# Patient Record
Sex: FEMALE | Race: WHITE | Employment: OTHER | URBAN - METROPOLITAN AREA
[De-identification: names, ages, dates, MRNs, and addresses within clinical notes are randomized per-mention and may not be internally consistent; named-entity substitution may affect disease eponyms.]

---

## 2024-01-04 ENCOUNTER — APPOINTMENT (OUTPATIENT)
Dept: PHYSICAL THERAPY | Age: 79
End: 2024-01-04
Payer: MEDICARE

## 2024-01-08 ENCOUNTER — APPOINTMENT (OUTPATIENT)
Dept: PHYSICAL THERAPY | Age: 79
End: 2024-01-08
Payer: MEDICARE

## 2024-01-09 ENCOUNTER — HOSPITAL ENCOUNTER (OUTPATIENT)
Dept: PHYSICAL THERAPY | Age: 79
Setting detail: RECURRING SERIES
End: 2024-01-09
Payer: MEDICARE

## 2024-01-10 ENCOUNTER — APPOINTMENT (OUTPATIENT)
Dept: PHYSICAL THERAPY | Age: 79
End: 2024-01-10
Payer: MEDICARE

## 2024-01-12 ENCOUNTER — APPOINTMENT (OUTPATIENT)
Dept: PHYSICAL THERAPY | Age: 79
End: 2024-01-12
Payer: MEDICARE

## 2024-01-15 ENCOUNTER — APPOINTMENT (OUTPATIENT)
Dept: PHYSICAL THERAPY | Age: 79
End: 2024-01-15
Payer: MEDICARE

## 2024-01-17 ENCOUNTER — APPOINTMENT (OUTPATIENT)
Dept: PHYSICAL THERAPY | Age: 79
End: 2024-01-17
Payer: MEDICARE

## 2024-01-19 ENCOUNTER — APPOINTMENT (OUTPATIENT)
Dept: PHYSICAL THERAPY | Age: 79
End: 2024-01-19
Payer: MEDICARE

## 2024-01-22 ENCOUNTER — APPOINTMENT (OUTPATIENT)
Dept: PHYSICAL THERAPY | Age: 79
End: 2024-01-22
Payer: MEDICARE

## 2024-01-24 ENCOUNTER — APPOINTMENT (OUTPATIENT)
Dept: PHYSICAL THERAPY | Age: 79
End: 2024-01-24
Payer: MEDICARE

## 2024-01-26 ENCOUNTER — HOSPITAL ENCOUNTER (OUTPATIENT)
Dept: PHYSICAL THERAPY | Age: 79
Setting detail: RECURRING SERIES
Discharge: HOME | End: 2024-01-29
Payer: MEDICARE

## 2024-01-26 DIAGNOSIS — R29.898 WEAKNESS OF LEFT HAND: ICD-10-CM

## 2024-01-26 DIAGNOSIS — M79.641 PAIN IN RIGHT HAND: ICD-10-CM

## 2024-01-26 DIAGNOSIS — R29.898 WEAKNESS OF RIGHT HAND: ICD-10-CM

## 2024-01-26 DIAGNOSIS — M79.642 PAIN IN LEFT HAND: Primary | ICD-10-CM

## 2024-01-26 PROCEDURE — 97162 PT EVAL MOD COMPLEX 30 MIN: CPT

## 2024-01-26 ASSESSMENT — PAIN SCALES - GENERAL: PAINLEVEL_OUTOF10: 3

## 2024-01-26 NOTE — PROGRESS NOTES
Chantel Oneill  : 1945  Primary: [employer] [place] H[place]/p* (Medicare Managed)  Secondary:  [facility] @ [place] [address] [address] [place] [address]  Phone: [phone]  Fax: [phone] Plan Frequency: 2 times per week for 8 weeks    Plan of Care/Certification Expiration Date: 24        Plan of Care/Certification Expiration Date:  Plan of Care/Certification Expiration Date: 24    Frequency/Duration: Plan Frequency: 2 times per week for 8 weeks      Time In/Out:          PT Visit Info:    Plan Frequency: 2 times per week for 8 weeks      Visit Count:  1    OUTPATIENT PHYSICAL THERAPY:   Treatment Note 2024       Episode  (Carpal Tunnel Rehab)               Treatment Diagnosis:    Pain in left hand  Pain in right hand  Weakness of left hand  Weakness of right hand  Medical/Referring Diagnosis:    Left carpal tunnel syndrome    Referring Physician:  Elton Huber MD MD Orders:  PT Eval and Treat   Return MD Appt:  TBD   Date of Onset:  Onset Date: 20     Allergies:   Patient has no allergy information on record.  Restrictions/Precautions:   None      Interventions Planned (Treatment may consist of any combination of the following):     See Assessment Note    Subjective Comments:   See evaluation note  Initial Pain Level::     3/10  Post Session Pain Level:       3/10  Medications Last Reviewed:  2024  Updated Objective Findings:  See Evaluation Note from today    Evaluation 24:  TreatmentPalpation:   Tenderness to the left and right thenar eminence  Tenderness to the left CMC joint line tenderness  Tenderness at the left trapezium   Range of Motion:   Wrist extension left: 60 deg no change  Wrist flexion left: 70 deg pain thenar emmenence  Wrist extension right 60 deg no change  Wrist flexion right 70 deg no discomfort  Left hand:  Thumb abduction: 65 deg discomfort thenar emmenence  Thumb adduction: 45 deg no discomfort  Thumb flexion: 73 deg no

## 2024-01-26 NOTE — THERAPY EVALUATION
Chantel Oneill  : 1945  Primary: [employer] [facility] [place] Medicare Hmo/p* (Medicare Managed)  Secondary:  [facility] @ [place] [address]  Phone: [phone]  Fax: [phone] Plan Frequency: 2 times per week for 8 weeks    Plan of Care/Certification Expiration Date: 24        Plan of Care/Certification Expiration Date:  Plan of Care/Certification Expiration Date: 24    Frequency/Duration: Plan Frequency: 2 times per week for 8 weeks      Time In/Out:          PT Visit Info:    Plan Frequency: 2 times per week for 8 weeks      Visit Count:  1                OUTPATIENT PHYSICAL THERAPY:             Initial Assessment 2024               Episode (Carpal Tunnel Rehab)         Treatment Diagnosis:     Pain in left hand  Pain in right hand  Weakness of left hand  Weakness of right hand  Medical/Referring Diagnosis:    Left carpal tunnel syndrome    Referring Physician:  Elton Huber MD MD Orders:  PT Eval and Treat   Return MD Appt:  TBBRIAN  Date of Onset:  Onset Date: 20     Allergies:  Patient has no allergy information on record.  Restrictions/Precautions:    None      Medications Last Reviewed:  2024     SUBJECTIVE   History of Injury/Illness (Reason for Referral):  Patient is a pleasant 78 year old female with complaints of bilateral thenar eminence pain (left is worse than right). Patient notes the pain began prior to 3 years ago and she was sent for nerve conduction testing. She notes that she was then referred to a hand specialist. She recently had an injection in the left carpal tunnel which improved her pain for 2 weeks. Patient notes she has pain with resting her hand on a surface. For example, she notes that when resting her hand on her lap she rests with the hand palm up to avoid pressure on the hand which can lead to burning sensation. Patient notes she cannot open jars and has some difficulty lifting items in the kitchen. She

## 2024-01-29 ENCOUNTER — HOSPITAL ENCOUNTER (OUTPATIENT)
Dept: PHYSICAL THERAPY | Age: 79
Setting detail: RECURRING SERIES
Discharge: HOME | End: 2024-02-01
Payer: MEDICARE

## 2024-01-29 ENCOUNTER — APPOINTMENT (OUTPATIENT)
Dept: PHYSICAL THERAPY | Age: 79
End: 2024-01-29
Payer: MEDICARE

## 2024-01-29 PROCEDURE — 97140 MANUAL THERAPY 1/> REGIONS: CPT

## 2024-01-29 PROCEDURE — 97110 THERAPEUTIC EXERCISES: CPT

## 2024-01-29 NOTE — PROGRESS NOTES
compensatory activities    Date: 1/26/24  Visit 1 1/29/24 (visit 2)      Modalities:  10 min (unbilled)       Evaluation only ** Paraffin post exercise x10 in to B hands                      Therapeutic Exercise:  25 min        Finger flexion () and extend x30        Lumbrical  x20        Forearm pronation/supination 2x10        Yellow ball squeeze x20 B        Wrist flexion/extension 2x10              Proprioceptive Activities:                                Manual Therapy:  15 min        PROM into flexion, extension supination/pronation              Functional Activities:                                            /Session Summary:    Treatment Assessment:   Pt did well with increased AROM and did not report increased pain. Continue to progress hand and wrist strengthening.  Communication/Consultation:  Pt to continue current HEP.  Equipment provided today:  None  Recommendations/Intent for next treatment session: Next visit will focus on pain management, hand mobility and strength.    >Total Treatment Billable Duration:  40 minutes   Time In: 0331  Time Out: 0421    Melodie Jeffries PTA         [employer]  [facility] [employer] [facility]  Appt Desk     Future Appointments   Date Time Provider Department Center   1/31/2024  3:30 PM Melodie Jeffries, PTA [facility]   2/6/2024  2:00 PM Frank Alvares, PT [facility]   2/9/2024  2:00 PM Frank Alvares, PT [facility]   2/16/2024  3:30 PM Leno Vitale, PT [facility]   2/20/2024  2:00 PM Melodie Jeffries, PTA [facility]   2/23/2024  3:30 PM Melodie Jeffries, PTA [facility]   3/1/2024  9:30 AM Melodie Jeffries, PTA [facility]

## 2024-01-31 ENCOUNTER — APPOINTMENT (OUTPATIENT)
Dept: PHYSICAL THERAPY | Age: 79
End: 2024-01-31
Payer: MEDICARE

## 2024-01-31 ENCOUNTER — HOSPITAL ENCOUNTER (OUTPATIENT)
Dept: PHYSICAL THERAPY | Age: 79
Setting detail: RECURRING SERIES
Discharge: HOME | End: 2024-02-03
Payer: MEDICARE

## 2024-01-31 PROCEDURE — 97110 THERAPEUTIC EXERCISES: CPT

## 2024-01-31 PROCEDURE — 97140 MANUAL THERAPY 1/> REGIONS: CPT

## 2024-01-31 NOTE — PROGRESS NOTES
Chantel Oneill  : 1945  Primary: [employer] [facility] [employer] [place] H[employer]/p* (Medicare Managed)  Secondary:  [facility] @ [place] [address] [address] [place] [address]  Phone: [phone]  Fax: [phone] Plan Frequency: 2 times per week for 8 weeks    Plan of Care/Certification Expiration Date: 24        Plan of Care/Certification Expiration Date:  Plan of Care/Certification Expiration Date: 24    Frequency/Duration: Plan Frequency: 2 times per week for 8 weeks      Time In/Out:   Time In: 1535  Time Out: 1615      PT Visit Info:    Plan Frequency: 2 times per week for 8 weeks      Visit Count:  2    OUTPATIENT PHYSICAL THERAPY:   Treatment Note 2024       Episode  (Carpal Tunnel Rehab)               Treatment Diagnosis:    Pain in left hand  Pain in right hand  Weakness of left hand  Weakness of right hand  Medical/Referring Diagnosis:    Left carpal tunnel syndrome      Referring Physician:  Elton Huber MD MD Orders:  PT Eval and Treat   Return MD Appt:  TBD   Date of Onset:  Onset Date: 20     Allergies:   Patient has no allergy information on record.  Restrictions/Precautions:   None      Interventions Planned (Treatment may consist of any combination of the following):     See Assessment Note    Subjective Comments:   Pt states that she was slightly sore after the last visit. She also reports that the paraffin felt like it was burning the R hand but she states that she does have any burns or redness on the hand.  Initial Pain Level::  no number given  Post Session Pain Level:    no increase reported  Medications Last Reviewed:  2024  Updated Objective Findings:      Evaluation 24:  TreatmentPalpation:   Tenderness to the left and right thenar eminence  Tenderness to the left CMC joint line tenderness  Tenderness at the left trapezium   Range of Motion:   Wrist extension left: 60 deg no change  Wrist flexion left: 70 deg pain thenar emmenence  Wrist

## 2024-01-31 NOTE — PROGRESS NOTES
Chantel Oneill  : 1945  Primary: [employer] H[employer]p* (Medicare Managed)  Secondary:  [facility] @ [place] [address] [address] [place] [address]  Phone: [phone]  Fax: [phone] Plan Frequency: 2 times per week for 8 weeks    Plan of Care/Certification Expiration Date: 24        Plan of Care/Certification Expiration Date:  Plan of Care/Certification Expiration Date: 24    Frequency/Duration: Plan Frequency: 2 times per week for 8 weeks      Time In/Out:          PT Visit Info:    Plan Frequency: 2 times per week for 8 weeks      Visit Count:  2    OUTPATIENT PHYSICAL THERAPY:   Treatment Note 2024       Episode  (Carpal Tunnel Rehab)               Treatment Diagnosis:    Pain in left hand  Pain in right hand  Weakness of left hand  Weakness of right hand  Medical/Referring Diagnosis:    Left carpal tunnel syndrome      Referring Physician:  Elton Huber MD MD Orders:  PT Eval and Treat   Return MD Appt:  TBD   Date of Onset:  Onset Date: 20     Allergies:   Patient has no allergy information on record.  Restrictions/Precautions:   None      Interventions Planned (Treatment may consist of any combination of the following):     See Assessment Note    Subjective Comments:   Pt reports relief from the HEP. She continues to report intermittent burning in the L hand.  Initial Pain Level::  no number given  Post Session Pain Level:    no increase reported  Medications Last Reviewed:  2024  Updated Objective Findings:      Evaluation 24:  TreatmentPalpation:   Tenderness to the left and right thenar eminence  Tenderness to the left CMC joint line tenderness  Tenderness at the left trapezium   Range of Motion:   Wrist extension left: 60 deg no change  Wrist flexion left: 70 deg pain thenar emmenence  Wrist extension right 60 deg no change  Wrist flexion right 70 deg no discomfort  Left hand:  Thumb abduction: 65 deg discomfort thenar

## 2024-02-06 ENCOUNTER — HOSPITAL ENCOUNTER (OUTPATIENT)
Dept: PHYSICAL THERAPY | Age: 79
Setting detail: RECURRING SERIES
Discharge: HOME | End: 2024-02-09
Payer: MEDICARE

## 2024-02-06 PROCEDURE — 97140 MANUAL THERAPY 1/> REGIONS: CPT

## 2024-02-06 PROCEDURE — 97110 THERAPEUTIC EXERCISES: CPT

## 2024-02-06 ASSESSMENT — PAIN SCALES - GENERAL: PAINLEVEL_OUTOF10: 3

## 2024-02-06 NOTE — PROGRESS NOTES
discomfort  Thumb flexion: 73 deg no change or increase  Right hand:  Thumb abduction: 80 deg no pain  Thumb adduction: 45 deg no pain  Thumb flexion: 65 deg no pain  Strength:   Left thumb abduction: 4+/5 - pain at CMC  Left thumb adduction: 3+/5  Left thumb extension: 4/5  Right thumb abduction: 4+/5  Right thumb adduction 3+/5  Right thumb extension: 4/5  Special Tests:   CMC joint grind test RIGHT : (-)  CMC joint grind test LEFT: (+)  CMC joint distraction LEFT: PAINFUL  Phalens test left: (+) left thenar eminence pain  Phalens test right: (-) right thenar eminence pain  Neuro Screen:   No diminished sensation bilateral hands  Burning noted in thenar eminence (right worse then left)  Treatment   TREATMENT:   THERAPEUTIC ACTIVITY: ( see below for minutes): Therapeutic activities per grid below to improve mobility, strength, balance and coordination. Required minimal visual, verbal, manual and tactile cues to improve independence and safety with daily activities .  THERAPEUTIC EXERCISE: (see below for minutes): Exercises per grid below to improve mobility, strength, balance and coordination. Required minimal verbal and manual cues to promote proper body alignment, promote proper body posture and promote proper body mechanics. Progressed resistance, range, repetitions and complexity of movement as indicated.  MANUAL THERAPY: (see below for minutes): Joint mobilization and Soft tissue mobilization was utilized and necessary because of the patient's restricted joint motion, painful spasm, loss of articular motion and restricted motion of soft tissue.   MODALITIES: (see below for minutes): to decrease pain   SELF CARE: (see below for minutes): Procedure(s) (per grid) utilized to improve and/or restore self-care/home management as related to dressing, bathing and grooming. Required minimal verbal cueing to facilitate activities of daily living skills and compensatory activities    Date: 1/26/24  Visit 1 1/29/24 (visit

## 2024-02-06 NOTE — THERAPY RECERTIFICATION
Chantel Oneill  : 1945  Primary: [employer] [place] H[place]/p* (Medicare Managed)  Secondary:  [facility] @ [place] [address]  Phone: [phone]  Fax: [phone] Plan Frequency: 2 times per week for 8 weeks    Plan of Care/Certification Expiration Date: 24        Plan of Care/Certification Expiration Date:  Plan of Care/Certification Expiration Date: 24    Frequency/Duration: Plan Frequency: 2 times per week for 8 weeks      Time In/Out:   Time In: 0200  Time Out: 0245      PT Visit Info:    Plan Frequency: 2 times per week for 8 weeks      Visit Count:  3                OUTPATIENT PHYSICAL THERAPY:             Therapy Re-Certification 2024               Episode (Carpal Tunnel Rehab)         Treatment Diagnosis:     Pain in left hand  Pain in right hand  Weakness of left hand  Weakness of right hand  Medical/Referring Diagnosis:    Left carpal tunnel syndrome  Left carpal tunnel syndrome    Referring Physician:  Elton Huber MD MD Orders:  PT Eval and Treat   Return MD Appt:  TBD  Date of Onset:  Onset Date: 20     Allergies:  Patient has no allergy information on record.  Restrictions/Precautions:    None      Medications Last Reviewed:  2024     SUBJECTIVE   History of Injury/Illness (Reason for Referral):  Patient is a pleasant 78 year old female with complaints of bilateral thenar eminence pain (left is worse than right). Patient notes the pain began prior to 3 years ago and she was sent for nerve conduction testing. She notes that she was then referred to a hand specialist. She recently had an injection in the left carpal tunnel which improved her pain for 2 weeks. Patient notes she has pain with resting her hand on a surface. For example, she notes that when resting her hand on her lap she rests with the hand palm up to avoid pressure on the hand which can lead to burning sensation. Patient notes she cannot open

## 2024-02-09 ENCOUNTER — HOSPITAL ENCOUNTER (OUTPATIENT)
Dept: PHYSICAL THERAPY | Age: 79
Setting detail: RECURRING SERIES
Discharge: HOME | End: 2024-02-12
Payer: MEDICARE

## 2024-02-09 PROCEDURE — 97140 MANUAL THERAPY 1/> REGIONS: CPT

## 2024-02-09 PROCEDURE — 97110 THERAPEUTIC EXERCISES: CPT

## 2024-02-09 NOTE — PROGRESS NOTES
Chantel Oneill  : 1945  Primary: [employer] [place] H[place]/p* (Medicare Managed)  Secondary:  [facility] @ [place] [address] [address] [place] [address]  Phone: [phone]  Fax: [phone] Plan Frequency: 2 times per week for 8 weeks    Plan of Care/Certification Expiration Date: 24        Plan of Care/Certification Expiration Date:  Plan of Care/Certification Expiration Date: 24    Frequency/Duration: Plan Frequency: 2 times per week for 8 weeks      Time In/Out:   Time In: 0200  Time Out: 0245      PT Visit Info:    Plan Frequency: 2 times per week for 8 weeks      Visit Count:  4    OUTPATIENT PHYSICAL THERAPY:   Treatment Note 2024       Episode  (Carpal Tunnel Rehab)               Treatment Diagnosis:    Pain in left hand  Pain in right hand  Weakness of left hand  Weakness of right hand  Medical/Referring Diagnosis:    Left carpal tunnel syndrome      Referring Physician:  Elton Huber MD MD Orders:  PT Eval and Treat   Return MD Appt:  TBD   Date of Onset:  Onset Date: 20     Allergies:   Patient has no allergy information on record.  Restrictions/Precautions:   None      Interventions Planned (Treatment may consist of any combination of the following):     See Assessment Note    Subjective Comments:   Pt notes hands feeling better.    Initial Pain Level::  3/10  Post Session Pain Level:    3/10  Medications Last Reviewed:  2024  Updated Objective Findings:      Evaluation 24:  TreatmentPalpation:   Tenderness to the left and right thenar eminence  Tenderness to the left CMC joint line tenderness  Tenderness at the left trapezium   Range of Motion:   Wrist extension left: 60 deg no change  Wrist flexion left: 70 deg pain thenar emmenence  Wrist extension right 60 deg no change  Wrist flexion right 70 deg no discomfort  Left hand:  Thumb abduction: 65 deg discomfort thenar emmenence  Thumb adduction: 45 deg no discomfort  Thumb flexion: 73

## 2024-02-16 ENCOUNTER — HOSPITAL ENCOUNTER (OUTPATIENT)
Dept: PHYSICAL THERAPY | Age: 79
Setting detail: RECURRING SERIES
Discharge: HOME | End: 2024-02-19
Payer: MEDICARE

## 2024-02-16 PROCEDURE — 97140 MANUAL THERAPY 1/> REGIONS: CPT

## 2024-02-16 PROCEDURE — 97110 THERAPEUTIC EXERCISES: CPT

## 2024-02-16 NOTE — PROGRESS NOTES
of daily living skills and compensatory activities    Date: 1/26/24  Visit 1 1/29/24 (visit 2) 1/31/24 (visit 3) 2/6/24  Visit 4  Re-Cert Note 2/9/24  Visit 5 2/16/24  Visit 6   Modalities:  10 min (unbilled)        Evaluation only ** Paraffin post exercise x10 in to B hands On hold                         Therapeutic Exercise:  25 min 25 min 15 min 15 min 15 min     Finger flexion () and extend x30 Finger flexion  and extend 2x10 Finger flexion  and extend 2x10 Finger flexion  and extend 2x10    -     Lumbrical  x20 Finger and thumb abduction/adduction 2x10 Scapular retraction 2 x 15 Scapular retraction 2 x 15    Seated, with hands on thighs  2x10     Forearm pronation/supination 2x10 Forearm pron/supination 2x10 Cervical flexion 2 x 10 Cervical flexion 2 x 10    Cervical flex f/b retract  1x10     Yellow ball squeeze x20 B   Nustep x 5 min x lvl 1      Wrist flexion/extension 2x10 Wrist flexion/ext 2x10  Thoracic x 10 flexion to extension in sitting with hands supported on table  (Trouble extending through thoracic from flexed position) Cervical retract with extn  2x10        Low rows 1 x 10 x RTB    Proprioceptive Activities:                                    Manual Therapy:  15 min 15 min 30 min 30 min 15 min     PROM into flexion, extension supination/pronation STM and PROM to B hands and wrists STM bilateral thenar emenence, carpal tunnel STM bilateral thenar emenence, carpal tunnel STM bilateral thenar emenence, carpal tunnel            Functional Activities:                                                 /Session Summary:    Treatment Assessment:   Patient continues to improve over all. She has some difficulty with cervical retraction and had trouble with scapular retraction until manual cueing was provided.     Communication/Consultation:  Pt to continue current HEP.  Equipment provided today:  None  Recommendations/Intent for next treatment session: Next visit will continue focus on

## 2024-02-20 ENCOUNTER — HOSPITAL ENCOUNTER (OUTPATIENT)
Dept: PHYSICAL THERAPY | Age: 79
Setting detail: RECURRING SERIES
Discharge: HOME | End: 2024-02-23
Payer: MEDICARE

## 2024-02-20 PROCEDURE — 97110 THERAPEUTIC EXERCISES: CPT

## 2024-02-20 PROCEDURE — 97140 MANUAL THERAPY 1/> REGIONS: CPT

## 2024-02-20 NOTE — PROGRESS NOTES
Chantel Oneill  : 1945  Primary: [employer] [place] [employer] [place] (Medicare Managed)  Secondary:  [facility] @ [place]  [address] [place] [address]  Phone: [phone]  Fax: [phone] Plan Frequency: 2 times per week for 8 weeks    Plan of Care/Certification Expiration Date: 24        Plan of Care/Certification Expiration Date:  Plan of Care/Certification Expiration Date: 24    Frequency/Duration: Plan Frequency: 2 times per week for 8 weeks      Time In/Out:   Time In: 1400  Time Out: 1445      PT Visit Info:    Plan Frequency: 2 times per week for 8 weeks      Visit Count:  6    OUTPATIENT PHYSICAL THERAPY:   Treatment Note 2024       Episode  (Carpal Tunnel Rehab)               Treatment Diagnosis:    Pain in left hand  Pain in right hand  Weakness of left hand  Weakness of right hand  Medical/Referring Diagnosis:    Left carpal tunnel syndrome      Referring Physician:  Elton Huber MD MD Orders:  PT Eval and Treat   Return MD Appt:  TBD   Date of Onset:  Onset Date: 20     Allergies:   Patient has no allergy information on record.  Restrictions/Precautions:   None      Interventions Planned (Treatment may consist of any combination of the following):     See Assessment Note    Subjective Comments:   Pt reports much less pain in the B hands. She wants to continue a few more visits and then discharge.  Initial Pain Level::  0/10  Post Session Pain Level:    No worse/10  Medications Last Reviewed:  2024  Updated Objective Findings:      Evaluation 24:  TreatmentPalpation:   Tenderness to the left and right thenar eminence  Tenderness to the left CMC joint line tenderness  Tenderness at the left trapezium   Range of Motion:   Wrist extension left: 60 deg no change  Wrist flexion left: 70 deg pain thenar emmenence  Wrist extension right 60 deg no change  Wrist flexion right 70 deg no discomfort  Left hand:  Thumb abduction: 65 deg discomfort

## 2024-02-23 ENCOUNTER — HOSPITAL ENCOUNTER (OUTPATIENT)
Dept: PHYSICAL THERAPY | Age: 79
Setting detail: RECURRING SERIES
Discharge: HOME | End: 2024-02-26
Payer: MEDICARE

## 2024-02-23 PROCEDURE — 97140 MANUAL THERAPY 1/> REGIONS: CPT

## 2024-02-23 PROCEDURE — 97110 THERAPEUTIC EXERCISES: CPT

## 2024-02-23 NOTE — PROGRESS NOTES
Chantel Oneill  : 1945  Primary: [employer] [place] H[place]/p* (Medicare Managed)  Secondary:  [facility] @ [place]  [address] [address]  Phone: [phone]  Fax: [phone] Plan Frequency: 2 times per week for 8 weeks    Plan of Care/Certification Expiration Date: 24        Plan of Care/Certification Expiration Date:  Plan of Care/Certification Expiration Date: 24    Frequency/Duration: Plan Frequency: 2 times per week for 8 weeks      Time In/Out:   Time In: 1530  Time Out: 1615      PT Visit Info:    Plan Frequency: 2 times per week for 8 weeks      Visit Count:  7    OUTPATIENT PHYSICAL THERAPY:   Treatment Note and Discharge 2024       Episode  (Carpal Tunnel Rehab)               Treatment Diagnosis:    Pain in left hand  Pain in right hand  Weakness of left hand  Weakness of right hand  Medical/Referring Diagnosis:    Left carpal tunnel syndrome      Referring Physician:  Elton Huber MD MD Orders:  PT Eval and Treat   Return MD Appt:  TBD   Date of Onset:  Onset Date: 20     Allergies:   Patient has no allergy information on record.  Restrictions/Precautions:   None      Interventions Planned (Treatment may consist of any combination of the following):     See Assessment Note    Subjective Comments:   Pt reports no pain in the hands or wrists and requests to discharge. She reports no difficulty turning a doorknob but has mild difficulty opening a tight jar.  Initial Pain Level::  0/10  Post Session Pain Level:    0/10  Medications Last Reviewed:  2024  Updated Objective Findings:      Evaluation 24:  TreatmentPalpation:   Tenderness to the left and right thenar eminence  Tenderness to the left CMC joint line tenderness  Tenderness at the left trapezium   Range of Motion:   Wrist extension left: 60 deg no change  Wrist flexion left: 70 deg pain thenar emmenence  Wrist extension right 60 deg no change  Wrist flexion right 70

## 2024-02-27 ENCOUNTER — APPOINTMENT (OUTPATIENT)
Dept: PHYSICAL THERAPY | Age: 79
End: 2024-02-27
Payer: MEDICARE